# Patient Record
Sex: FEMALE | Race: WHITE | Employment: PART TIME
[De-identification: names, ages, dates, MRNs, and addresses within clinical notes are randomized per-mention and may not be internally consistent; named-entity substitution may affect disease eponyms.]

---
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
## 2021-10-01 ENCOUNTER — HOSPITAL ENCOUNTER (INPATIENT)
Facility: HOSPITAL | Age: 21
LOS: 4 days | Discharge: HOME | End: 2021-10-05
Attending: EMERGENCY MEDICINE | Admitting: HOSPITALIST
Payer: COMMERCIAL

## 2021-10-01 ENCOUNTER — APPOINTMENT (OUTPATIENT)
Dept: CT IMAGING | Facility: HOSPITAL | Age: 21
End: 2021-10-01
Attending: EMERGENCY MEDICINE
Payer: COMMERCIAL

## 2021-10-01 DIAGNOSIS — U07.1 PNEUMONIA DUE TO 2019 NOVEL CORONAVIRUS: ICD-10-CM

## 2021-10-01 DIAGNOSIS — J12.82 PNEUMONIA DUE TO 2019 NOVEL CORONAVIRUS: ICD-10-CM

## 2021-10-01 LAB
ALBUMIN SERPL-MCNC: 3.2 G/DL (ref 3.5–5)
ALP SERPL-CCNC: 58 U/L (ref 45–120)
ALT SERPL W P-5'-P-CCNC: 27 U/L (ref 0–45)
ANION GAP SERPL CALCULATED.3IONS-SCNC: 11 MMOL/L (ref 5–18)
AST SERPL W P-5'-P-CCNC: 29 U/L (ref 0–40)
BASOPHILS # BLD AUTO: 0 10E3/UL (ref 0–0.2)
BASOPHILS NFR BLD AUTO: 0 %
BILIRUB DIRECT SERPL-MCNC: 0.3 MG/DL
BILIRUB SERPL-MCNC: 0.7 MG/DL (ref 0–1)
BUN SERPL-MCNC: 11 MG/DL (ref 8–22)
C REACTIVE PROTEIN LHE: 5.4 MG/DL (ref 0–0.8)
CALCIUM SERPL-MCNC: 8.9 MG/DL (ref 8.5–10.5)
CHLORIDE BLD-SCNC: 103 MMOL/L (ref 98–107)
CO2 SERPL-SCNC: 25 MMOL/L (ref 22–31)
CREAT SERPL-MCNC: 0.68 MG/DL (ref 0.6–1.1)
D DIMER PPP FEU-MCNC: 1.5 UG/ML FEU (ref 0–0.5)
EOSINOPHIL # BLD AUTO: 0 10E3/UL (ref 0–0.7)
EOSINOPHIL NFR BLD AUTO: 0 %
ERYTHROCYTE [DISTWIDTH] IN BLOOD BY AUTOMATED COUNT: 11.9 % (ref 10–15)
GFR SERPL CREATININE-BSD FRML MDRD: >90 ML/MIN/1.73M2
GLUCOSE BLD-MCNC: 85 MG/DL (ref 70–125)
HCG SERPL-ACNC: <2 MLU/ML (ref 0–4)
HCT VFR BLD AUTO: 38.4 % (ref 35–47)
HGB BLD-MCNC: 13.1 G/DL (ref 11.7–15.7)
HOLD SPECIMEN: NORMAL
IMM GRANULOCYTES # BLD: 0 10E3/UL
IMM GRANULOCYTES NFR BLD: 0 %
LACTATE SERPL-SCNC: 1.1 MMOL/L (ref 0.7–2)
LYMPHOCYTES # BLD AUTO: 1.1 10E3/UL (ref 0.8–5.3)
LYMPHOCYTES NFR BLD AUTO: 19 %
MCH RBC QN AUTO: 29.6 PG (ref 26.5–33)
MCHC RBC AUTO-ENTMCNC: 34.1 G/DL (ref 31.5–36.5)
MCV RBC AUTO: 87 FL (ref 78–100)
MONOCYTES # BLD AUTO: 0.4 10E3/UL (ref 0–1.3)
MONOCYTES NFR BLD AUTO: 6 %
NEUTROPHILS # BLD AUTO: 4.4 10E3/UL (ref 1.6–8.3)
NEUTROPHILS NFR BLD AUTO: 75 %
NRBC # BLD AUTO: 0 10E3/UL
NRBC BLD AUTO-RTO: 0 /100
PLATELET # BLD AUTO: 200 10E3/UL (ref 150–450)
POTASSIUM BLD-SCNC: 3.6 MMOL/L (ref 3.5–5)
PROT SERPL-MCNC: 7.5 G/DL (ref 6–8)
RBC # BLD AUTO: 4.42 10E6/UL (ref 3.8–5.2)
SODIUM SERPL-SCNC: 139 MMOL/L (ref 136–145)
TROPONIN I SERPL-MCNC: <0.01 NG/ML (ref 0–0.29)
WBC # BLD AUTO: 5.9 10E3/UL (ref 4–11)

## 2021-10-01 PROCEDURE — 99223 1ST HOSP IP/OBS HIGH 75: CPT | Performed by: HOSPITALIST

## 2021-10-01 PROCEDURE — 250N000011 HC RX IP 250 OP 636: Performed by: EMERGENCY MEDICINE

## 2021-10-01 PROCEDURE — 250N000011 HC RX IP 250 OP 636: Performed by: HOSPITALIST

## 2021-10-01 PROCEDURE — 120N000001 HC R&B MED SURG/OB

## 2021-10-01 PROCEDURE — 83605 ASSAY OF LACTIC ACID: CPT | Performed by: EMERGENCY MEDICINE

## 2021-10-01 PROCEDURE — 96365 THER/PROPH/DIAG IV INF INIT: CPT

## 2021-10-01 PROCEDURE — 84702 CHORIONIC GONADOTROPIN TEST: CPT | Performed by: EMERGENCY MEDICINE

## 2021-10-01 PROCEDURE — 82248 BILIRUBIN DIRECT: CPT | Performed by: EMERGENCY MEDICINE

## 2021-10-01 PROCEDURE — 96375 TX/PRO/DX INJ NEW DRUG ADDON: CPT

## 2021-10-01 PROCEDURE — 85610 PROTHROMBIN TIME: CPT | Performed by: HOSPITALIST

## 2021-10-01 PROCEDURE — 85384 FIBRINOGEN ACTIVITY: CPT | Performed by: HOSPITALIST

## 2021-10-01 PROCEDURE — 84484 ASSAY OF TROPONIN QUANT: CPT | Performed by: EMERGENCY MEDICINE

## 2021-10-01 PROCEDURE — 71275 CT ANGIOGRAPHY CHEST: CPT

## 2021-10-01 PROCEDURE — 86141 C-REACTIVE PROTEIN HS: CPT | Performed by: EMERGENCY MEDICINE

## 2021-10-01 PROCEDURE — 85025 COMPLETE CBC W/AUTO DIFF WBC: CPT | Performed by: EMERGENCY MEDICINE

## 2021-10-01 PROCEDURE — XW033E5 INTRODUCTION OF REMDESIVIR ANTI-INFECTIVE INTO PERIPHERAL VEIN, PERCUTANEOUS APPROACH, NEW TECHNOLOGY GROUP 5: ICD-10-PCS | Performed by: HOSPITALIST

## 2021-10-01 PROCEDURE — 93005 ELECTROCARDIOGRAM TRACING: CPT | Performed by: HOSPITALIST

## 2021-10-01 PROCEDURE — 250N000009 HC RX 250: Performed by: HOSPITALIST

## 2021-10-01 PROCEDURE — 36415 COLL VENOUS BLD VENIPUNCTURE: CPT | Performed by: EMERGENCY MEDICINE

## 2021-10-01 PROCEDURE — 83615 LACTATE (LD) (LDH) ENZYME: CPT | Performed by: HOSPITALIST

## 2021-10-01 PROCEDURE — 85379 FIBRIN DEGRADATION QUANT: CPT | Performed by: EMERGENCY MEDICINE

## 2021-10-01 PROCEDURE — 96366 THER/PROPH/DIAG IV INF ADDON: CPT

## 2021-10-01 PROCEDURE — 258N000003 HC RX IP 258 OP 636: Performed by: HOSPITALIST

## 2021-10-01 PROCEDURE — 36415 COLL VENOUS BLD VENIPUNCTURE: CPT | Performed by: HOSPITALIST

## 2021-10-01 PROCEDURE — 99285 EMERGENCY DEPT VISIT HI MDM: CPT | Mod: 25

## 2021-10-01 RX ORDER — CALCIUM CARBONATE 500 MG/1
1000 TABLET, CHEWABLE ORAL 4 TIMES DAILY PRN
Status: DISCONTINUED | OUTPATIENT
Start: 2021-10-01 | End: 2021-10-05 | Stop reason: HOSPADM

## 2021-10-01 RX ORDER — CARBOXYMETHYLCELLULOSE SODIUM 5 MG/ML
1 SOLUTION/ DROPS OPHTHALMIC 3 TIMES DAILY PRN
Status: DISCONTINUED | OUTPATIENT
Start: 2021-10-01 | End: 2021-10-01

## 2021-10-01 RX ORDER — ONDANSETRON 2 MG/ML
4 INJECTION INTRAMUSCULAR; INTRAVENOUS EVERY 6 HOURS PRN
Status: DISCONTINUED | OUTPATIENT
Start: 2021-10-01 | End: 2021-10-05 | Stop reason: HOSPADM

## 2021-10-01 RX ORDER — ALBUTEROL SULFATE 90 UG/1
2 AEROSOL, METERED RESPIRATORY (INHALATION) EVERY 4 HOURS PRN
Status: DISCONTINUED | OUTPATIENT
Start: 2021-10-01 | End: 2021-10-05 | Stop reason: HOSPADM

## 2021-10-01 RX ORDER — BISACODYL 10 MG
10 SUPPOSITORY, RECTAL RECTAL DAILY PRN
Status: DISCONTINUED | OUTPATIENT
Start: 2021-10-01 | End: 2021-10-05 | Stop reason: HOSPADM

## 2021-10-01 RX ORDER — IOPAMIDOL 755 MG/ML
100 INJECTION, SOLUTION INTRAVASCULAR ONCE
Status: COMPLETED | OUTPATIENT
Start: 2021-10-01 | End: 2021-10-01

## 2021-10-01 RX ORDER — POLYETHYLENE GLYCOL 3350 17 G/17G
17 POWDER, FOR SOLUTION ORAL DAILY PRN
Status: DISCONTINUED | OUTPATIENT
Start: 2021-10-01 | End: 2021-10-05 | Stop reason: HOSPADM

## 2021-10-01 RX ORDER — ACETAMINOPHEN 325 MG/1
975 TABLET ORAL EVERY 8 HOURS PRN
Status: DISCONTINUED | OUTPATIENT
Start: 2021-10-01 | End: 2021-10-05 | Stop reason: HOSPADM

## 2021-10-01 RX ORDER — FAMOTIDINE 20 MG/1
20 TABLET, FILM COATED ORAL 2 TIMES DAILY PRN
Status: DISCONTINUED | OUTPATIENT
Start: 2021-10-01 | End: 2021-10-05 | Stop reason: HOSPADM

## 2021-10-01 RX ORDER — LIDOCAINE 40 MG/G
CREAM TOPICAL
Status: DISCONTINUED | OUTPATIENT
Start: 2021-10-01 | End: 2021-10-05 | Stop reason: HOSPADM

## 2021-10-01 RX ORDER — DEXAMETHASONE SODIUM PHOSPHATE 10 MG/ML
6 INJECTION, SOLUTION INTRAMUSCULAR; INTRAVENOUS ONCE
Status: COMPLETED | OUTPATIENT
Start: 2021-10-01 | End: 2021-10-01

## 2021-10-01 RX ORDER — ONDANSETRON 4 MG/1
4 TABLET, ORALLY DISINTEGRATING ORAL EVERY 6 HOURS PRN
Status: DISCONTINUED | OUTPATIENT
Start: 2021-10-01 | End: 2021-10-05 | Stop reason: HOSPADM

## 2021-10-01 RX ADMIN — DEXAMETHASONE SODIUM PHOSPHATE 6 MG: 10 INJECTION, SOLUTION INTRAMUSCULAR; INTRAVENOUS at 14:51

## 2021-10-01 RX ADMIN — REMDESIVIR 200 MG: 100 INJECTION, POWDER, LYOPHILIZED, FOR SOLUTION INTRAVENOUS at 22:58

## 2021-10-01 RX ADMIN — IOPAMIDOL 100 ML: 755 INJECTION, SOLUTION INTRAVENOUS at 16:43

## 2021-10-01 RX ADMIN — ENOXAPARIN SODIUM 40 MG: 40 INJECTION SUBCUTANEOUS at 23:01

## 2021-10-01 ASSESSMENT — ACTIVITIES OF DAILY LIVING (ADL): DEPENDENT_IADLS:: INDEPENDENT

## 2021-10-01 ASSESSMENT — MIFFLIN-ST. JEOR: SCORE: 1652.65

## 2021-10-01 NOTE — CONSULTS
Care Management Initial Consult    General Information  Assessment completed with: VM-chart review,    Type of CM/SW Visit: Initial Assessment    Primary Care Provider verified and updated as needed: Yes   Readmission within the last 30 days: no previous admission in last 30 days      Reason for Consult: discharge planning  Advance Care Planning:            Communication Assessment  Patient's communication style: spoken language (English or Bilingual)             Cognitive  Cognitive/Neuro/Behavioral: WDL                      Living Environment:   People in home: parent(s)     Current living Arrangements:        Able to return to prior arrangements:         Family/Social Support:  Care provided by:    Provides care for:                  Description of Support System:           Current Resources:   Patient receiving home care services: No     Community Resources: None  Equipment currently used at home: none  Supplies currently used at home: None    Employment/Financial:  Employment Status:          Financial Concerns:             Lifestyle & Psychosocial Needs:  Social Determinants of Health     Tobacco Use: Low Risk      Smoking Tobacco Use: Never Smoker     Smokeless Tobacco Use: Never Used   Alcohol Use:      Frequency of Alcohol Consumption:      Average Number of Drinks:      Frequency of Binge Drinking:    Financial Resource Strain:      Difficulty of Paying Living Expenses:    Food Insecurity:      Worried About Running Out of Food in the Last Year:      Ran Out of Food in the Last Year:    Transportation Needs:      Lack of Transportation (Medical):      Lack of Transportation (Non-Medical):    Physical Activity:      Days of Exercise per Week:      Minutes of Exercise per Session:    Stress:      Feeling of Stress :    Social Connections:      Frequency of Communication with Friends and Family:      Frequency of Social Gatherings with Friends and Family:      Attends Zoroastrianism Services:      Active Member of  Clubs or Organizations:      Attends Club or Organization Meetings:      Marital Status:    Intimate Partner Violence:      Fear of Current or Ex-Partner:      Emotionally Abused:      Physically Abused:      Sexually Abused:    Depression:      PHQ-2 Score:    Housing Stability:      Unable to Pay for Housing in the Last Year:      Number of Places Lived in the Last Year:      Unstable Housing in the Last Year:        Functional Status:  Prior to admission patient needed assistance:   Dependent ADLs:: Independent  Dependent IADLs:: Independent       Mental Health Status:          Chemical Dependency Status:                Values/Beliefs:  Spiritual, Cultural Beliefs, Mosque Practices, Values that affect care:                 Additional Information:    Pt +Covid. Info obtained via chart review.   Pt lives with father in a private residence. She is independent with all ADL's, has no services and no DME used.     Anticipate discharge home without needs depending on progression of care. Family to transport home at discharge.     Arianne Dowling, RN, CM, SVITLANA

## 2021-10-01 NOTE — ED PROVIDER NOTES
Emergency Department Encounter     Evaluation Date & Time:   10/1/2021  2:12 PM    CHIEF COMPLAINT:  Covid Concern      Triage Note:amb to triage.  Pt states about 2 wks ago dx'd with COVID.  Got better after about a week but then started getting different sx.  Reports passed out on , and cough and sob really bad.  Pt having fever/chills and waking up drenched.  O2 low in triage.  87% RA.  O2 n/c 2 liters applied.  CP with cough.  Last tylenol last night.         Impression and Plan       FINAL IMPRESSION:    ICD-10-CM    1. Pneumonia due to 2019 novel coronavirus  U07.1     J12.82          ED COURSE & MEDICAL DECISION MAKIN:27 PM Met with the patient, obtained history of present illness, and performed initial physical exam.  5:16 PM Discussed the case with Dr. Dubon, Hospitalist, who agrees to accept the patient.    PPE: Provider wore gloves, N95 mask, eye protection, surgical cap, and paper mask.     21 year old female, history of hypertriglyceridemia, acanthosis nigricans and vitamin D deficiency, who presents for evaluation of progressively worsening SOB x 5 days associated with Covid (diagnosed 2 weeks ago). She reports ongoing episodes of lightheadedness x 5 days and had a syncopal episode on .   She has not been vaccinated for Covid.     On exam, she has RRR with slight coarse breath sounds diffusely.     Patient hypoxic on presentation (87% on RA) - improved to upper 90%s on 4L via NC.  Patient given 6mg IV Decadron for hypoxia.     Patient placed on cardiac monitor, IV access established and blood sent for labs.    EKG ordered, however not yet performed. Troponin WNL (<0.01).    Labs remarkable for no leukocytosis (WBC 5.9) with elevated CRP (5.4) and normal lactate (1.1).  She has no significant electrolyte derangements, renal or hepatic impairment.  Pregnancy test negative.    CTA chest performed and demonstrated:  1.  No PE  2.  Moderate peripheral groundglass and solid subpleural  consolidation in the lungs typical of COVID-19 pneumonia  3.  Mildly mediastinal lymphadenopathy likely reactive  4.  Splenomegaly    Given hypoxia and worsening shortness of breath, decision made to admit patient for further monitoring and management.  Patient hemodynamically stable throughout ED course.      At the conclusion of the encounter I discussed the results of all the tests and the disposition. The questions were answered. The patient acknowledged understanding and was agreeable with the care plan.      MEDICATIONS GIVEN IN THE EMERGENCY DEPARTMENT:  Medications   dexamethasone PF (DECADRON) injection 6 mg (6 mg Intravenous Given 10/1/21 1451)   iopamidol (ISOVUE-370) solution 100 mL (100 mLs Intravenous Given 10/1/21 1643)       NEW PRESCRIPTIONS STARTED AT TODAY'S ED VISIT:  New Prescriptions    No medications on file       HPI     The history is provided by the patient.      Jaylene Burks is a 21 year old female with a pertinent history of hypertriglyceridemia, acanthosis nigricans and vitamin D deficiency, who presents to this ED for evaluation of a Covid concern.    Patient was diagnosed with COVID 2 weeks ago; symptoms at that time were primarily sore throat, ear pain, chills and sweats. Symptoms persisted for ~1 week and then resolved. Then Sunday (5 days ago), she developed episodes of lightheadedness and proceeded to pass out while she was sitting in a chair. She denies any associated injury.  She went develop shortness of breath Monday night into Tuesday which has gradually worsened.  The shortness of breath is worse with exertion.  She reports associated cough when she takes deep breaths.  She has generalized chest pain with cough and deep inspiration.  She reports ongoing sweats, but is unsure if she has fevers.  She has not been vaccinated against Covid.    She otherwise has been in her usual state of health and denies abdominal pain, N/V/D or other concerns.    LNMP 10 days ago.  She had  taken birth control pills for 1 month; last dose 3 weeks ago.    Patient was vaping for ~2 months, however quit a couple months ago.    No chronic medical conditions.  No previous surgeries.  NKDA.    REVIEW OF SYSTEMS:  All other systems reviewed and are negative.      Medical History     No past medical history on file.    Past Surgical History:   Procedure Laterality Date     NO HISTORY OF SURGERY         Family History   Problem Relation Age of Onset     No Known Problems Mother      No Known Problems Father      Thyroid Disease Maternal Grandmother        Social History     Tobacco Use     Smoking status: Never Smoker     Smokeless tobacco: Never Used     Tobacco comment: Uncle smokes outdoors temp lives with pt.s family   Vaping Use     Vaping Use: Former     Start date: 5/1/2021     Quit date: 9/1/2021     Substances: Nicotine   Substance Use Topics     Alcohol use: Not Currently     Drug use: Never       No current outpatient medications on file.      Physical Exam     First Vitals:  Patient Vitals for the past 24 hrs:   BP Temp Temp src Pulse Resp SpO2 Height Weight   10/01/21 2030 101/53 -- -- 56 25 100 % -- --   10/01/21 2015 106/56 -- -- 109 20 98 % -- --   10/2000 99/56 -- -- 77 26 99 % -- --   10/01/21 1900 95/52 -- -- 64 26 93 % -- --   10/01/21 1845 100/59 -- -- 54 23 100 % -- --   10/01/21 1830 98/54 -- -- 52 (!) 34 97 % -- --   10/01/21 1815 129/81 -- -- 75 (!) 31 99 % -- --   10/01/21 1800 93/55 -- -- 55 23 95 % -- --   10/01/21 1745 110/61 -- -- 66 (!) 34 99 % -- --   10/01/21 1730 105/56 -- -- 102 22 99 % -- --   10/01/21 1715 99/58 -- -- 53 21 100 % -- --   10/01/21 1700 104/59 -- -- 61 21 100 % -- --   10/01/21 1649 101/56 -- -- 59 18 100 % -- --   10/01/21 1615 106/55 -- -- 60 (!) 33 100 % -- --   10/01/21 1557 106/56 -- -- 71 -- 98 % -- --   10/01/21 1545 107/58 -- -- 63 -- 100 % -- --   10/01/21 1530 103/55 -- -- 58 -- 100 % -- --   10/01/21 1500 108/60 -- -- 62 22 100 % -- --  "  10/01/21 1446 113/55 -- -- 69 15 99 % -- --   10/01/21 1418 107/61 -- -- 65 -- 95 % -- --   10/01/21 1346 -- -- -- -- -- 94 % -- --   10/01/21 1343 133/66 97.5  F (36.4  C) Temporal 85 22 (!) 87 % 1.727 m (5' 8\") 83.9 kg (185 lb)       PHYSICAL EXAM:   Physical Exam    GENERAL: Awake, alert.  In no acute distress.   HEENT: Normocephalic, atraumatic. Pupils equal, round and reactive. Conjunctiva normal.  Normal posterior oropharynx without swelling, erythema or exudates.  NECK: No stridor.  PULMONARY: Symmetrical, slightly coarse, breath sounds without distress.  No audible wheezes or rales.  CARDIO: Regular rate and rhythm.  No significant murmur, rub or gallop.  Radial pulses strong and symmetrical.  ABDOMINAL: Abdomen soft, non-distended and non-tender to palpation.   EXTREMITIES: No lower extremity swelling or edema.      NEURO: Alert and oriented to person, place and time.  Cranial nerves grossly intact.  No focal motor deficit.  PSYCH: Normal mood and affect.  SKIN: No rashes.     Results     LAB:  All pertinent labs reviewed and interpreted  Labs Ordered and Resulted from Time of ED Arrival Up to the Time of Departure from the ED   CRP INFLAMMATION - Abnormal; Notable for the following components:       Result Value    CRP 5.4 (*)     All other components within normal limits   HEPATIC FUNCTION PANEL - Abnormal; Notable for the following components:    Albumin 3.2 (*)     All other components within normal limits   D DIMER QUANTITATIVE - Abnormal; Notable for the following components:    D-Dimer Quantitative 1.50 (*)     All other components within normal limits    Narrative:     This D-dimer assay is intended for use in conjunction with a clinical pretest probability assessment model to exclude pulmonary embolism (PE) and deep venous thrombosis (DVT) in outpatients suspected of PE or DVT. The cut-off value is 0.50 ug/mL FEU.   BASIC METABOLIC PANEL - Normal   TROPONIN I - Normal   LACTIC ACID WHOLE BLOOD - " Normal   HCG QUANTITATIVE PREGNANCY - Normal   CBC WITH PLATELETS AND DIFFERENTIAL   EXTRA BLUE TOP TUBE   EXTRA RED TOP TUBE   EXTRA GREEN TOP (LITHIUM HEPARIN) TUBE   EXTRA PURPLE TOP TUBE   CARDIAC CONTINUOUS MONITORING   CALL   CBC WITH PLATELETS & DIFFERENTIAL    Narrative:     The following orders were created for panel order CBC with Platelets & Differential.  Procedure                               Abnormality         Status                     ---------                               -----------         ------                     CBC with platelets and d...[[id number]]                      Final result                 Please view results for these tests on the individual orders.   EXTRA TUBE    Narrative:     The following orders were created for panel order Posey Draw.  Procedure                               Abnormality         Status                     ---------                               -----------         ------                     Extra Blue Top Tube[[id number]]                              Final result               Extra Red Top Tube[[id number]]                               Final result               Extra Green Top (Lithium...[[id number]]                      Final result               Extra Purple Top Tube[[id number]]                            Final result                 Please view results for these tests on the individual orders.       RADIOLOGY:  CT Chest Pulmonary Embolism w Contrast   Final Result   IMPRESSION:   1.  No pulmonary embolism.      2.  Moderate peripheral groundglass and solid subpleural consolidation in the lungs typical of COVID-19 pneumonia      3.  Mildly mediastinal lymphadenopathy likely reactive      4.  Splenomegaly.          I, Floridalma Moyer, am serving as a scribe to document services personally performed by Malinda Flores MD based on my observation and the provider's statements to me. I, Malinda Flores MD attest that Floridalma Moyer is acting in a scribe capacity, has  observed my performance of the services and has documented them in accordance with my direction.    Malinda Flores MD  Emergency Medicine  [facility] EMERGENCY DEPARTMENT         Malinda Flores MD  10/01/21 5532

## 2021-10-01 NOTE — H&P
[facility]    History and Physical - Hospitalist Service       Date of Admission:  10/1/2021  Jaylene Burks,  2000, MRN [id number]  PCP: LESTER TRAYLOR    Assessment & Plan      Jaylene Burks is a 21 year old female admitted on 10/1/2021. She does not have any significant past medical history and was diagnosed with Covid on  and returns for worsening dyspnea found to be hypoxic     COVID-19 Diagnosis Details:  Onset of COVID symptoms unknown   Date of positive test results    Research study No     # Confirmed COVID-19 infection    # Acute Hypoxic Respiratory Failure secondary to COVID-19 infection  # Viral Pneumonia secondary to COVID-19 infection  - Chest CT with contrast shows moderate bilateral groundglass infiltrates and dense subpleural consolidations.  On admission she is requiring 4 L of supplemental oxygen.  - Continue supportive care with continuous pulse oximetry, COVID-19 special precautions, minimized lab draws, and care consolidation  - Oxygen: continue current support with nasal cannula at 4 L/min; titrate to keep SpO2 between 90-96%  - Labs: CBC, creatinine/BMP, CRP, LDH, D-dimer and fibrinogen daily or as appropriate until patient clinically stable.  - Imaging: no additional imaging needed at this time  - Breathing treatments: albuterol inhaler four times a day PRN; avoid nebulizers in favor of MDIs   - IV fluids: not indicated at this time  - Antibiotics: not indicated   - Treatments: Dexamethasone 6 mg x 10 days or until hospital discharge, started on 10/1 and Remdesivir x 5 days or until hospital discharge, started on 10/1  - Contact Counseling: addressed. She has been quarantining at her mom's house and her mom is out of town.  - DVT Prophylaxis: At high risk of thrombotic complications due to COVID-19 disease (last DDimer 1.5).          - PROPHYLACTIC dosing: lovenox 40mg daily  - Immunization status: Not vaccinated   - Discharge Anticoagulation:  At discharge consider one of the following for 30 days and until the patient has returned to normal mobility:        - Apixaban (Eliquis) 2.5 mg two times a day        - Rivaroxaban (Xarelto) 10 mg once daily    #Syncope  x1 on 9/26  Suspect due to COVID19 and hypoxia  No PE on CT  Check EKG and monitor on tele overnight for completeness       Diet:   regular diet  Costello Catheter: Not present  Central Lines: None  Code Status:   Full    Clinically Significant Risk Factors Present on Admission                   Disposition Plan   Expected discharge: 10/03/2021 recommended to prior living arrangement once hypoxia resolved.     The patient's care was discussed with the Patient.    Caridad Dubon MD  [facility]  Text page via [facility] Paging/Directory      ______________________________________________________________________    Chief Complaint   Chills, dyspnea    History of Present Illness   Jaylene Burks is a 21 year old female who presents for chills and shortness of breath.  Patient reports she had a sore throat a few weeks ago and was diagnosed with COVID-19.  Symptoms then improved for a little while.  However in the past week or so they have worsened again.  She had a syncopal event on 9/26.  She has been having bad chills for a few days and feeling more short of breath.  She denies significant cough.  Sore throat has resolved.  Appetite is very poor and food does not taste right.  Denies nausea or diarrhea.  Very tearful.  She did not receive Covid vaccination.  Patient declined for me to call and update of her parents.    Review of Systems    The 10 point Review of Systems is negative other than noted in the HPI or here.     Past Medical History    I have reviewed this patient's medical history and updated it with pertinent information if needed.   No past medical history on file.    Past Surgical History   I have reviewed this patient's surgical history and updated it with pertinent  information if needed.  Past Surgical History:   Procedure Laterality Date     NO HISTORY OF SURGERY         Social History   I have reviewed this patient's social history and updated it with pertinent information if needed.  Social History     Tobacco Use     Smoking status: Never Smoker     Smokeless tobacco: Never Used     Tobacco comment: Uncle smokes outdoors temp lives with pt.s family   Vaping Use     Vaping Use: Former     Start date: 5/1/2021     Quit date: 9/1/2021     Substances: Nicotine   Substance Use Topics     Alcohol use: Not Currently     Drug use: Never       Family History   I have reviewed this patient's family history and updated it with pertinent information if needed.  Family History   Problem Relation Age of Onset     No Known Problems Mother      No Known Problems Father      Thyroid Disease Maternal Grandmother        Prior to Admission Medications   None     Allergies   Allergies   Allergen Reactions     Nkda [No Known Drug Allergies]        Physical Exam   Vital Signs: Temp: 97.5  F (36.4  C) Temp src: Temporal BP: 99/58 Pulse: 53   Resp: 21 SpO2: 100 % O2 Device: Nasal cannula Oxygen Delivery: 4 LPM  Weight: 185 lbs 0 oz    General: in no apparent distress, non-toxic and alert female lying in hospital bed oriented x3. tearful  HEENT: Head normocephalic atraumatic, oral mucosa moist. Sclerae anicteric  CV: Regular rhythm, normal rate, no murmurs  Resp: No wheezes, no rales or rhonchi, no focal consolidations  GI: Belly soft, nondistended, nontender, bowel sounds present  Skin: No rashes or lesions  Extremities: No peripheral edema  Psych: Normal affect, anxious mood  Neuro: CNII-XII grossly intact, moving all 4 extremities    Data   Data reviewed today: I reviewed all medications, new labs and imaging results over the last 24 hours.  No EKG tracing obtained    Recent Results (from the past 12 hour(s))   Basic metabolic panel    Collection Time: 10/01/21  2:45 PM   Result Value Ref Range     Sodium 139 136 - 145 mmol/L    Potassium 3.6 3.5 - 5.0 mmol/L    Chloride 103 98 - 107 mmol/L    Carbon Dioxide (CO2) 25 22 - 31 mmol/L    Anion Gap 11 5 - 18 mmol/L    Urea Nitrogen 11 8 - 22 mg/dL    Creatinine 0.68 0.60 - 1.10 mg/dL    Calcium 8.9 8.5 - 10.5 mg/dL    Glucose 85 70 - 125 mg/dL    GFR Estimate >90 >60 mL/min/1.73m2   Troponin I    Collection Time: 10/01/21  2:45 PM   Result Value Ref Range    Troponin I <0.01 0.00 - 0.29 ng/mL   Lactic acid whole blood    Collection Time: 10/01/21  2:45 PM   Result Value Ref Range    Lactic Acid 1.1 0.7 - 2.0 mmol/L   CRP inflammation    Collection Time: 10/01/21  2:45 PM   Result Value Ref Range    CRP 5.4 (H) 0.0-<0.8 mg/dL   Hepatic function panel    Collection Time: 10/01/21  2:45 PM   Result Value Ref Range    Bilirubin Total 0.7 0.0 - 1.0 mg/dL    Bilirubin Direct 0.3 <=0.5 mg/dL    Protein Total 7.5 6.0 - 8.0 g/dL    Albumin 3.2 (L) 3.5 - 5.0 g/dL    Alkaline Phosphatase 58 45 - 120 U/L    AST 29 0 - 40 U/L    ALT 27 0 - 45 U/L   D dimer quantitative    Collection Time: 10/01/21  2:45 PM   Result Value Ref Range    D-Dimer Quantitative 1.50 (H) 0.00 - 0.50 ug/mL FEU   CBC with platelets and differential    Collection Time: 10/01/21  2:45 PM   Result Value Ref Range    WBC Count 5.9 4.0 - 11.0 10e3/uL    RBC Count 4.42 3.80 - 5.20 10e6/uL    Hemoglobin 13.1 11.7 - 15.7 g/dL    Hematocrit 38.4 35.0 - 47.0 %    MCV 87 78 - 100 fL    MCH 29.6 26.5 - 33.0 pg    MCHC 34.1 31.5 - 36.5 g/dL    RDW 11.9 10.0 - 15.0 %    Platelet Count 200 150 - 450 10e3/uL    % Neutrophils 75 %    % Lymphocytes 19 %    % Monocytes 6 %    % Eosinophils 0 %    % Basophils 0 %    % Immature Granulocytes 0 %    NRBCs per 100 WBC 0 <1 /100    Absolute Neutrophils 4.4 1.6 - 8.3 10e3/uL    Absolute Lymphocytes 1.1 0.8 - 5.3 10e3/uL    Absolute Monocytes 0.4 0.0 - 1.3 10e3/uL    Absolute Eosinophils 0.0 0.0 - 0.7 10e3/uL    Absolute Basophils 0.0 0.0 - 0.2 10e3/uL    Absolute Immature  Granulocytes 0.0 <=0.0 10e3/uL    Absolute NRBCs 0.0 10e3/uL   Extra Blue Top Tube    Collection Time: 10/01/21  2:45 PM   Result Value Ref Range    Hold Specimen JIC    Extra Red Top Tube    Collection Time: 10/01/21  2:45 PM   Result Value Ref Range    Hold Specimen JIC    Extra Green Top (Lithium Heparin) Tube    Collection Time: 10/01/21  2:45 PM   Result Value Ref Range    Hold Specimen JIC    Extra Purple Top Tube    Collection Time: 10/01/21  2:45 PM   Result Value Ref Range    Hold Specimen JIC    HCG quantitative pregnancy (blood)    Collection Time: 10/01/21  2:45 PM   Result Value Ref Range    hCG Quantitative <2 0 - 4 mlU/mL

## 2021-10-01 NOTE — ED TRIAGE NOTES
amb to triage.  Pt states about 2 wks ago dx'd with COVID.  Got better after about a week but then started getting different sx.  Reports passed out on Sunday, and cough and sob really bad.  Pt having fever/chills and waking up drenched.  O2 low in triage.  87% RA.  O2 n/c 2 liters applied.  CP with cough.  Last tylenol last night.

## 2021-10-01 NOTE — ED NOTES
On room.  Not doing much.    Given turkey sandwich and fluids.    Continues on oxygen.  No respiratory distress noted.

## 2021-10-01 NOTE — PHARMACY-ADMISSION MEDICATION HISTORY
Pharmacy Note - Admission Medication History    Pertinent Provider Information: The patient reports taking zinc and vitamin C over the past few weeks when she got sick but does not take these normally.  She reports taking ibuprofen 400-600 mg on an occasional basis.     ______________________________________________________________________    Prior To Admission (PTA) med list completed and updated in EMR.       No outpatient medications have been marked as taking for the 10/1/21 encounter (Hospital Encounter).       Information source(s): Patient and CareEverywhere/SureScripts  Method of interview communication: phone    Summary of Changes to PTA Med List  New: n/a  Discontinued: n/a  Changed: n/a    Patient was asked about OTC/herbal products specifically.  PTA med list reflects this.    Allergies were reviewed, assessed, and updated with the patient.      Patient does not use any multi-dose medications prior to admission.    The information provided in this note is only as accurate as the sources available at the time of the update(s).    Thank you for the opportunity to participate in the care of this patient.    Ivette Camacho RPH  10/1/2021 3:11 PM

## 2021-10-02 LAB
ANION GAP SERPL CALCULATED.3IONS-SCNC: 10 MMOL/L (ref 5–18)
ATRIAL RATE - MUSE: 44 BPM
BUN SERPL-MCNC: 14 MG/DL (ref 8–22)
C REACTIVE PROTEIN LHE: 4.6 MG/DL (ref 0–0.8)
CALCIUM SERPL-MCNC: 8.9 MG/DL (ref 8.5–10.5)
CHLORIDE BLD-SCNC: 107 MMOL/L (ref 98–107)
CO2 SERPL-SCNC: 22 MMOL/L (ref 22–31)
CREAT SERPL-MCNC: 0.53 MG/DL (ref 0.6–1.1)
D DIMER PPP FEU-MCNC: 0.77 UG/ML FEU (ref 0–0.5)
DIASTOLIC BLOOD PRESSURE - MUSE: 55 MMHG
ERYTHROCYTE [DISTWIDTH] IN BLOOD BY AUTOMATED COUNT: 11.9 % (ref 10–15)
FIBRINOGEN PPP-MCNC: 489 MG/DL (ref 170–490)
FIBRINOGEN PPP-MCNC: 504 MG/DL (ref 170–490)
GFR SERPL CREATININE-BSD FRML MDRD: >90 ML/MIN/1.73M2
GLUCOSE BLD-MCNC: 104 MG/DL (ref 70–125)
HCT VFR BLD AUTO: 36.6 % (ref 35–47)
HGB BLD-MCNC: 12.5 G/DL (ref 11.7–15.7)
INR PPP: 1.02 (ref 0.85–1.15)
INTERPRETATION ECG - MUSE: NORMAL
LACTATE SERPL-SCNC: 1 MMOL/L (ref 0.7–2)
LDH SERPL L TO P-CCNC: 374 U/L (ref 125–220)
LDH SERPL L TO P-CCNC: 452 U/L (ref 125–220)
MAGNESIUM SERPL-MCNC: 1.9 MG/DL (ref 1.8–2.6)
MCH RBC QN AUTO: 29.5 PG (ref 26.5–33)
MCHC RBC AUTO-ENTMCNC: 34.2 G/DL (ref 31.5–36.5)
MCV RBC AUTO: 86 FL (ref 78–100)
P AXIS - MUSE: 59 DEGREES
PLATELET # BLD AUTO: 222 10E3/UL (ref 150–450)
POTASSIUM BLD-SCNC: 4.1 MMOL/L (ref 3.5–5)
PR INTERVAL - MUSE: 116 MS
QRS DURATION - MUSE: 108 MS
QT - MUSE: 482 MS
QTC - MUSE: 412 MS
R AXIS - MUSE: 74 DEGREES
RBC # BLD AUTO: 4.24 10E6/UL (ref 3.8–5.2)
SODIUM SERPL-SCNC: 139 MMOL/L (ref 136–145)
SYSTOLIC BLOOD PRESSURE - MUSE: 100 MMHG
T AXIS - MUSE: 48 DEGREES
TROPONIN I SERPL-MCNC: <0.01 NG/ML (ref 0–0.29)
TSH SERPL DL<=0.005 MIU/L-ACNC: 0.49 UIU/ML (ref 0.3–5)
VENTRICULAR RATE- MUSE: 44 BPM
WBC # BLD AUTO: 3.8 10E3/UL (ref 4–11)

## 2021-10-02 PROCEDURE — 85384 FIBRINOGEN ACTIVITY: CPT | Performed by: HOSPITALIST

## 2021-10-02 PROCEDURE — 99233 SBSQ HOSP IP/OBS HIGH 50: CPT | Performed by: FAMILY MEDICINE

## 2021-10-02 PROCEDURE — 36415 COLL VENOUS BLD VENIPUNCTURE: CPT | Performed by: FAMILY MEDICINE

## 2021-10-02 PROCEDURE — 258N000003 HC RX IP 258 OP 636: Performed by: HOSPITALIST

## 2021-10-02 PROCEDURE — 250N000009 HC RX 250: Performed by: HOSPITALIST

## 2021-10-02 PROCEDURE — 84443 ASSAY THYROID STIM HORMONE: CPT | Performed by: FAMILY MEDICINE

## 2021-10-02 PROCEDURE — 85379 FIBRIN DEGRADATION QUANT: CPT | Performed by: HOSPITALIST

## 2021-10-02 PROCEDURE — 250N000012 HC RX MED GY IP 250 OP 636 PS 637: Performed by: HOSPITALIST

## 2021-10-02 PROCEDURE — 80048 BASIC METABOLIC PNL TOTAL CA: CPT | Performed by: HOSPITALIST

## 2021-10-02 PROCEDURE — 83615 LACTATE (LD) (LDH) ENZYME: CPT | Performed by: HOSPITALIST

## 2021-10-02 PROCEDURE — 83735 ASSAY OF MAGNESIUM: CPT | Performed by: FAMILY MEDICINE

## 2021-10-02 PROCEDURE — 250N000011 HC RX IP 250 OP 636: Performed by: HOSPITALIST

## 2021-10-02 PROCEDURE — 84484 ASSAY OF TROPONIN QUANT: CPT | Performed by: HOSPITALIST

## 2021-10-02 PROCEDURE — 83605 ASSAY OF LACTIC ACID: CPT | Performed by: FAMILY MEDICINE

## 2021-10-02 PROCEDURE — 120N000001 HC R&B MED SURG/OB

## 2021-10-02 PROCEDURE — 85027 COMPLETE CBC AUTOMATED: CPT | Performed by: HOSPITALIST

## 2021-10-02 PROCEDURE — 36415 COLL VENOUS BLD VENIPUNCTURE: CPT | Performed by: HOSPITALIST

## 2021-10-02 PROCEDURE — 86141 C-REACTIVE PROTEIN HS: CPT | Performed by: HOSPITALIST

## 2021-10-02 RX ADMIN — REMDESIVIR 100 MG: 100 INJECTION, POWDER, LYOPHILIZED, FOR SOLUTION INTRAVENOUS at 18:16

## 2021-10-02 RX ADMIN — SODIUM CHLORIDE 50 ML: 9 INJECTION, SOLUTION INTRAVENOUS at 01:05

## 2021-10-02 RX ADMIN — ENOXAPARIN SODIUM 40 MG: 40 INJECTION SUBCUTANEOUS at 22:25

## 2021-10-02 RX ADMIN — SODIUM CHLORIDE 50 ML: 9 INJECTION, SOLUTION INTRAVENOUS at 19:53

## 2021-10-02 RX ADMIN — DEXAMETHASONE 6 MG: 4 TABLET ORAL at 08:36

## 2021-10-02 NOTE — PLAN OF CARE
Problem: Adult Inpatient Plan of Care  Goal: Optimal Comfort and Wellbeing  Outcome: Improving   Pt currently on 3 Lpm O2 via nasal canula. Pt dropped as low as 82% O2 saturation while transferring to [facility]. RN titrated O2 up to 10 Lpm to bring her up to 91% while seated. RN set Pt up for bed bath. Pt was unsteady on feet. Stand by assistance provided by RN. Pt was able to give herself a bed bath with setup assistance. Pt had some appetite. Pt to move to room 202 on P2 this AM. RN encouraged Pt to go into prone position as tolerated. No other concerns noted.   Hamilton Rios RN  10/2/2021  11:22 AM

## 2021-10-02 NOTE — PROGRESS NOTES
[facility]    Medicine Progress Note - Hospitalist Service       Date of Admission:  10/1/2021  Active Problems:    Pneumonia due to 2019 novel coronavirus     Assessment & Plan           21-year-old female otherwise healthy admitted for shortness of breath, lightheadedness, syncope found to have acute hypoxic respiratory failure secondary to Covid pneumonia    # Confirmed COVID-19 infection    # Acute Hypoxic Respiratory Failure secondary to COVID-19 infection  # Bradycardia     Symptom Onset 9/16   Date of 1st Positive Test Use date of first positive test.   Vaccination Status Not Vaccinated, but interested/contemplative       - Admit to medical floor with continuous pulse ox, COVID-19 special precautions  - Oxygen: continue current support with nasal cannula at 3 L/min; titrate to keep SpO2 between 90-96%  - Labs: daily COVID labs ordered (CBC, creatinine, retic count, LDH, INR/PT, D-dimer, fibrinogen, troponin, CRP)   - Imaging: chest CT with contrast ordered  - Breathing treatments: no inhalers needed; avoid nebulizers in favor of MDIs   - IV fluids: not indicated at this time  - Antibiotics: not indicated   - COVID-Focused Medications: Dexamethasone 6 mg x 10 days or until hospital discharge, started on 10/1 and Remdesivir x 5 days or until hospital discharge, started on 10/1  - DVT Prophylaxis: at high risk of thrombotic complications due to COVID-19 (DDimer = 0.77 ug/mL FEU (Ref range: 0.00 - 0.50 ug/mL FEU) ).          - PROPHYLACTIC dosing: lovenox 40mg daily        - consider anticoag on discharge for 30 days & until return to normal mobility    Sinus bradycardia-suspect chronic, heart rates in the low 60s on previous notes, more bradycardic here, high 30s to 60s.  Check TSH, magnesium, potassium okay.  Follow on telemetry, asymptomatic, has orthostatic lightheadedness but improved, had prior to admission.  Question if remdesivir contributing.       Diet: Combination Diet Regular  "Diet Adult    DVT Prophylaxis: Enoxaparin (Lovenox) SQ  Costello Catheter: Not present  Central Lines: None  Code Status: Full Code      Disposition Plan   Expected discharge: 10/03/2021   recommended to prior living arrangement once O2 use less than 0 liters/minute.     The patient's care was discussed with the Bedside Nurse, Care Coordinator/, Patient and Patient's Family. for total time 40 minutes with greater than 50% of total time spent in counseling and coordination of care.    LESTER KISER MD  Hospitalist Service  [facility]  Securely message with the Vocera Web Console (learn more here)  Text page via [employer] Paging/Directory        Clinically Significant Risk Factors Present on Admission                   ______________________________________________________________________    Interval History   Remainder of 12 point review of systems negative except as noted below    Subjective:  Feeling better.  Has mild lightheadedness with standing, present for over a week.  Mild nonproductive cough.  Regained her sense of taste.  Had fevers previously, none here.  No chest pain, shortness of breath improved.  Cough improved.  Overall feeling better.  Is willing to get the vaccine after discharge.    Data reviewed today: I reviewed all medications, new labs and imaging results over the last 24 hours.     Physical Exam   Vital Signs: Temp: 97.9  F (36.6  C) Temp src: Oral BP: 101/56 Pulse: 59   Resp: 19 SpO2: 99 % O2 Device: Nasal cannula Oxygen Delivery: 3 LPM  Weight: 185 lbs 0 oz  Physical Exam:  Temp:  [97.8  F (36.6  C)-98  F (36.7  C)] 97.9  F (36.6  C)  Pulse:  [] 59  Resp:  [17-34] 19  BP: ()/(43-81) 101/56  SpO2:  [81 %-100 %] 99 %    /56 (BP Location: Left arm)   Pulse 59   Temp 97.9  F (36.6  C) (Oral)   Resp 19   Ht 1.727 m (5' 8\")   Wt 83.9 kg (185 lb)   LMP 09/22/2021   SpO2 99%   BMI 28.13 kg/m    General appearance: alert, appears stated age " and cooperative  Head: Normocephalic, without obvious abnormality, atraumatic  Eyes: Clear conjuctiva  Neck: no JVD and supple, symmetrical, trachea midline  Lungs: clear to auscultation bilaterally  Heart: S1, S2 normal, no murmur, click, rub or gallop, regular rate and rhythm, regular rate and rhythm, S1, S2 normal, no murmur, click, rub or gallop  Abdomen: soft, non-tender; bowel sounds normal; no masses,  no organomegaly  Extremities: Esa's sign is negative, no sign of DVT  Skin: Skin color, texture, turgor normal. No rashes or lesions  Neurologic: Mental status: Alert, oriented, thought content appropriate  Cranial nerves: normal  Sensory: normal  Motor:grossly normal          Data   Recent Labs   Lab 10/02/21  0927 10/01/21  2302 10/01/21  1445   WBC 3.8*  --  5.9   HGB 12.5  --  13.1   MCV 86  --  87     --  200   INR  --  1.02  --      --  139   POTASSIUM 4.1  --  3.6   CHLORIDE 107  --  103   CO2 22  --  25   BUN 14  --  11   CR 0.53*  --  0.68   ANIONGAP 10  --  11   GERALDO 8.9  --  8.9     --  85   ALBUMIN  --   --  3.2*   PROTTOTAL  --   --  7.5   BILITOTAL  --   --  0.7   ALKPHOS  --   --  58   ALT  --   --  27   AST  --   --  29     No results found for this or any previous visit (from the past 24 hour(s)).  Medications     - MEDICATION INSTRUCTIONS -         remdesivir  100 mg Intravenous Q24H    And     sodium chloride 0.9%  50 mL Intravenous Q24H     dexamethasone  6 mg Oral Daily     enoxaparin ANTICOAGULANT  40 mg Subcutaneous Q24H     sodium chloride (PF)  3 mL Intracatheter Q8H

## 2021-10-02 NOTE — ED NOTES
Pt b/p 77/47 when laying on her side, repositioned on her back retook b/p 100/59, pt denies any complaints, EDMD updated, will continue to monitor and assist as needed.

## 2021-10-02 NOTE — PLAN OF CARE
Problem: Adult Inpatient Plan of Care  Goal: Plan of Care Review  Outcome: Improving  Flowsheets (Taken 10/2/2021 1255)  Plan of Care Reviewed With: patient  Progress: improving   Pt arrived from ER at 1200 via stretcher.  No pain noted.  Bp 102/57 and HR 50.  97% on 3L nasal canula.  Lung sounds  diminished.  Pt appears comfortable at this time.

## 2021-10-02 NOTE — ED NOTES
Pts call light answered and pt asking for new blanket and towel for back.  Tech also brought pt a new gown.  Pt assisted with gown change due to IV tubing and linen change.  Pt resting comfortably and not needing anything else at this time.

## 2021-10-03 LAB
ALBUMIN SERPL-MCNC: 2.9 G/DL (ref 3.5–5)
ALP SERPL-CCNC: 46 U/L (ref 45–120)
ALT SERPL W P-5'-P-CCNC: 24 U/L (ref 0–45)
ANION GAP SERPL CALCULATED.3IONS-SCNC: 9 MMOL/L (ref 5–18)
AST SERPL W P-5'-P-CCNC: 22 U/L (ref 0–40)
BILIRUB DIRECT SERPL-MCNC: 0.2 MG/DL
BILIRUB SERPL-MCNC: 0.5 MG/DL (ref 0–1)
BUN SERPL-MCNC: 14 MG/DL (ref 8–22)
C REACTIVE PROTEIN LHE: 1.6 MG/DL (ref 0–0.8)
CALCIUM SERPL-MCNC: 8.6 MG/DL (ref 8.5–10.5)
CHLORIDE BLD-SCNC: 109 MMOL/L (ref 98–107)
CO2 SERPL-SCNC: 24 MMOL/L (ref 22–31)
CREAT SERPL-MCNC: 0.54 MG/DL (ref 0.6–1.1)
D DIMER PPP FEU-MCNC: 0.61 UG/ML FEU (ref 0–0.5)
ERYTHROCYTE [DISTWIDTH] IN BLOOD BY AUTOMATED COUNT: 11.9 % (ref 10–15)
FIBRINOGEN PPP-MCNC: 389 MG/DL (ref 170–490)
GFR SERPL CREATININE-BSD FRML MDRD: >90 ML/MIN/1.73M2
GLUCOSE BLD-MCNC: 116 MG/DL (ref 70–125)
HCT VFR BLD AUTO: 35.2 % (ref 35–47)
HGB BLD-MCNC: 12 G/DL (ref 11.7–15.7)
LDH SERPL L TO P-CCNC: 385 U/L (ref 125–220)
MCH RBC QN AUTO: 29.9 PG (ref 26.5–33)
MCHC RBC AUTO-ENTMCNC: 34.1 G/DL (ref 31.5–36.5)
MCV RBC AUTO: 88 FL (ref 78–100)
PLATELET # BLD AUTO: 254 10E3/UL (ref 150–450)
POTASSIUM BLD-SCNC: 4.2 MMOL/L (ref 3.5–5)
PROT SERPL-MCNC: 6.6 G/DL (ref 6–8)
RBC # BLD AUTO: 4.02 10E6/UL (ref 3.8–5.2)
SODIUM SERPL-SCNC: 142 MMOL/L (ref 136–145)
WBC # BLD AUTO: 6 10E3/UL (ref 4–11)

## 2021-10-03 PROCEDURE — 36415 COLL VENOUS BLD VENIPUNCTURE: CPT | Performed by: HOSPITALIST

## 2021-10-03 PROCEDURE — 85384 FIBRINOGEN ACTIVITY: CPT | Performed by: HOSPITALIST

## 2021-10-03 PROCEDURE — 86141 C-REACTIVE PROTEIN HS: CPT | Performed by: HOSPITALIST

## 2021-10-03 PROCEDURE — 85027 COMPLETE CBC AUTOMATED: CPT | Performed by: HOSPITALIST

## 2021-10-03 PROCEDURE — 250N000009 HC RX 250: Performed by: HOSPITALIST

## 2021-10-03 PROCEDURE — 85379 FIBRIN DEGRADATION QUANT: CPT | Performed by: HOSPITALIST

## 2021-10-03 PROCEDURE — 83615 LACTATE (LD) (LDH) ENZYME: CPT | Performed by: HOSPITALIST

## 2021-10-03 PROCEDURE — 120N000001 HC R&B MED SURG/OB

## 2021-10-03 PROCEDURE — 258N000003 HC RX IP 258 OP 636: Performed by: HOSPITALIST

## 2021-10-03 PROCEDURE — 250N000011 HC RX IP 250 OP 636: Performed by: HOSPITALIST

## 2021-10-03 PROCEDURE — 250N000012 HC RX MED GY IP 250 OP 636 PS 637: Performed by: HOSPITALIST

## 2021-10-03 PROCEDURE — 82248 BILIRUBIN DIRECT: CPT | Performed by: FAMILY MEDICINE

## 2021-10-03 PROCEDURE — 99232 SBSQ HOSP IP/OBS MODERATE 35: CPT | Performed by: FAMILY MEDICINE

## 2021-10-03 RX ORDER — LANOLIN ALCOHOL/MO/W.PET/CERES
3 CREAM (GRAM) TOPICAL
Status: DISCONTINUED | OUTPATIENT
Start: 2021-10-03 | End: 2021-10-05 | Stop reason: HOSPADM

## 2021-10-03 RX ADMIN — DEXAMETHASONE 6 MG: 4 TABLET ORAL at 09:17

## 2021-10-03 RX ADMIN — REMDESIVIR 100 MG: 100 INJECTION, POWDER, LYOPHILIZED, FOR SOLUTION INTRAVENOUS at 17:43

## 2021-10-03 RX ADMIN — SODIUM CHLORIDE 50 ML: 9 INJECTION, SOLUTION INTRAVENOUS at 19:30

## 2021-10-03 RX ADMIN — ENOXAPARIN SODIUM 40 MG: 40 INJECTION SUBCUTANEOUS at 22:14

## 2021-10-03 NOTE — PROGRESS NOTES
[facility]    Medicine Progress Note - Hospitalist Service       Date of Admission:  10/1/2021  Active Problems:    Pneumonia due to 2019 novel coronavirus     Assessment & Plan             21-year-old female otherwise healthy admitted for shortness of breath, lightheadedness, syncope found to have acute hypoxic respiratory failure secondary to Covid pneumonia    # Confirmed COVID-19 infection    # Acute Hypoxic Respiratory Failure secondary to COVID-19 infection  # Bradycardia     Symptom Onset 9/16   Date of 1st Positive Test Use date of first positive test.   Vaccination Status Not Vaccinated, but interested/contemplative       - Admit to medical floor with continuous pulse ox, COVID-19 special precautions  - Oxygen: continue current support with nasal cannula at 0 L/min; titrate to keep SpO2 between 90-96%  - Labs: daily COVID labs ordered (CBC, creatinine, retic count, LDH, INR/PT, D-dimer, fibrinogen, troponin, CRP)   - Imaging: chest CT with contrast ordered  - Breathing treatments: no inhalers needed; avoid nebulizers in favor of MDIs   - IV fluids: not indicated at this time  - Antibiotics: not indicated   - COVID-Focused Medications: Dexamethasone 6 mg x 10 days or until hospital discharge, started on 10/1 and Remdesivir x 5 days or until hospital discharge, started on 10/1  - DVT Prophylaxis: at high risk of thrombotic complications due to COVID-19 (DDimer = 0.77 ug/mL FEU (Ref range: 0.00 - 0.50 ug/mL FEU) ).          - PROPHYLACTIC dosing: lovenox 40mg daily        - consider anticoag on discharge for 30 days & until return to normal mobility  Improved today, off of oxygen but still desats a little bit with activity according to staff.  Inflammatory markers improving, improved CRP, LDH, D-dimer  Continue current treatment.    Sinus bradycardia-suspect chronic, heart rates in the low 60s on previous notes, more bradycardic here, high 30s to 60s.  Normal TSH, magnesium, potassium  okay.  Follow on telemetry, asymptomatic, has orthostatic lightheadedness that has resolved.    Question if remdesivir contributing.  Bradycardia improved when awake, worse at night.  Continue remdesivir for now.       Diet: Regular Diet Adult    DVT Prophylaxis: Enoxaparin (Lovenox) SQ  Costello Catheter: Not present  Central Lines: None  Code Status: Full Code      Disposition Plan   Expected discharge: 10/03/2021   recommended to prior living arrangement once O2 use less than 0 liters/minute.     The patient's care was discussed with the Bedside Nurse, Care Coordinator/, Patient and Patient's Family. for total time 36 minutes with greater than 50% of total time spent in counseling and coordination of care.    LESTER KISER MD  Hospitalist Service  [facility]  Securely message with the Vocera Web Console (learn more here)  Text page via [employer] Paging/Directory        Clinically Significant Risk Factors Present on Admission                ______________________________________________________________________    Interval History   Remainder of 12 point review of systems negative except as noted below    Subjective:  Feeling better.  Has mild lightheadedness with standing, present for over a week.  Mild nonproductive cough.  Regained her sense of taste.  Had fevers previously, none here.  No chest pain, shortness of breath improved.  Cough improved.  Overall feeling better.  Is willing to get the vaccine after discharge.    Data reviewed today: I reviewed all medications, new labs and imaging results over the last 24 hours.     Physical Exam   Vital Signs: Temp: 98.2  F (36.8  C) Temp src: Oral BP: 106/52 Pulse: 58   Resp: 19 SpO2: 94 % O2 Device: None (Room air) Oxygen Delivery: 1/2 LPM  Weight: 185 lbs 0 oz  Physical Exam:  Temp:  [97.3  F (36.3  C)-98.2  F (36.8  C)] 98.2  F (36.8  C)  Pulse:  [37-66] 58  Resp:  [16-20] 19  BP: ()/(52-63) 106/52  SpO2:  [91 %-98 %] 94  "%    /52 (BP Location: Left arm)   Pulse 58   Temp 98.2  F (36.8  C) (Oral)   Resp 19   Ht 1.727 m (5' 8\")   Wt 83.9 kg (185 lb)   LMP 09/22/2021   SpO2 94%   BMI 28.13 kg/m    General appearance: alert, appears stated age and cooperative  Head: Normocephalic, without obvious abnormality, atraumatic  Eyes: Clear conjuctiva  Neck: no JVD and supple, symmetrical, trachea midline  Lungs: clear to auscultation bilaterally  Heart: S1, S2 normal, no murmur, click, rub or gallop, regular rate and rhythm, regular rate and rhythm, S1, S2 normal, no murmur, click, rub or gallop  Abdomen: soft, non-tender; bowel sounds normal; no masses,  no organomegaly  Extremities: Esa's sign is negative, no sign of DVT  Skin: Skin color, texture, turgor normal. No rashes or lesions  Neurologic: Mental status: Alert, oriented, thought content appropriate  Cranial nerves: normal  Sensory: normal  Motor:grossly normal          Data   Recent Labs   Lab 10/03/21  0636 10/02/21  0927 10/01/21  2302 10/01/21  1445   WBC 6.0 3.8*  --  5.9   HGB 12.0 12.5  --  13.1   MCV 88 86  --  87    222  --  200   INR  --   --  1.02  --     139  --  139   POTASSIUM 4.2 4.1  --  3.6   CHLORIDE 109* 107  --  103   CO2 24 22  --  25   BUN 14 14  --  11   CR 0.54* 0.53*  --  0.68   ANIONGAP 9 10  --  11   GERALDO 8.6 8.9  --  8.9    104  --  85   ALBUMIN 2.9*  --   --  3.2*   PROTTOTAL 6.6  --   --  7.5   BILITOTAL 0.5  --   --  0.7   ALKPHOS 46  --   --  58   ALT 24  --   --  27   AST 22  --   --  29     No results found for this or any previous visit (from the past 24 hour(s)).  Medications     - MEDICATION INSTRUCTIONS -         remdesivir  100 mg Intravenous Q24H    And     sodium chloride 0.9%  50 mL Intravenous Q24H     dexamethasone  6 mg Oral Daily     enoxaparin ANTICOAGULANT  40 mg Subcutaneous Q24H     sodium chloride (PF)  3 mL Intracatheter Q8H     "

## 2021-10-03 NOTE — PLAN OF CARE
Problem: Adult Inpatient Plan of Care  Goal: Optimal Comfort and Wellbeing  Outcome: Improving     Problem: Gas Exchange Impaired  Goal: Optimal Gas Exchange  Outcome: Improving   Patient alert and oriented x 4. No complain of pain. Not in respiratory distress. O2 saturation stable at 97-99% at 2L, O2 decreased to 1L tolerating it well O2 sat  at 93%.  Bradycardic throughout the shift, resident MD aware.  Encourage to use call light.  Slept on and off.   Latest 02 95% at .5L/NC

## 2021-10-03 NOTE — PROVIDER NOTIFICATION
Patient was bradycardic, HR lowest at 37. Resident MD notified regarding patient heart rate and ok to set the pulse ox alarm at 40.

## 2021-10-03 NOTE — PROGRESS NOTES
Care Management Follow Up    Length of Stay (days): 2    Expected Discharge Date: 10/03/2021 or 10/4/21, once stable off of oxygen (goal).       Concerns to be Addressed:   Alteration in respiratory status secondary to COVID-19, requiring IV Remdesivir and supplemental oxygen (now down to 1 liter).     Patient plan of care discussed at interdisciplinary rounds: Yes    Anticipated Discharge Disposition:  Home.      Anticipated Discharge Services:  None.  Anticipated Discharge DME:  To be determined.     Patient/family educated on Medicare website which has current facility and service quality ratings:  NA  Education Provided on the Discharge Plan:  Per team.  Patient/Family in Agreement with the Plan:  Yes    Referrals Placed by CM/SW:  None.  Private pay costs discussed: Not applicable     Additional Information:  Patient is independent with activities of daily living at baseline.  No care management needs identified at this time.        Ceci Irving RN

## 2021-10-03 NOTE — PLAN OF CARE
Problem: Gas Exchange Impaired  Goal: Optimal Gas Exchange  Outcome: Improving   Pt is now on room air, sating 91-92%.  Does desat with activity, but recovers quickly.  Encouraging self proning and incentive spirometry to 2500.  Lung sound diminished, but clear at this time.  Occasional congested, nonproductive cough.      Problem: Adult Inpatient Plan of Care  Goal: Optimal Comfort and Wellbeing  Outcome: Improving   No complaints of pain.  States she is starting to feel a little better.

## 2021-10-04 LAB
ANION GAP SERPL CALCULATED.3IONS-SCNC: 6 MMOL/L (ref 5–18)
BUN SERPL-MCNC: 14 MG/DL (ref 8–22)
C REACTIVE PROTEIN LHE: 0.5 MG/DL (ref 0–0.8)
CALCIUM SERPL-MCNC: 8.6 MG/DL (ref 8.5–10.5)
CHLORIDE BLD-SCNC: 108 MMOL/L (ref 98–107)
CO2 SERPL-SCNC: 25 MMOL/L (ref 22–31)
CREAT SERPL-MCNC: 0.54 MG/DL (ref 0.6–1.1)
D DIMER PPP FEU-MCNC: 0.57 UG/ML FEU (ref 0–0.5)
ERYTHROCYTE [DISTWIDTH] IN BLOOD BY AUTOMATED COUNT: 11.9 % (ref 10–15)
FIBRINOGEN PPP-MCNC: 342 MG/DL (ref 170–490)
GFR SERPL CREATININE-BSD FRML MDRD: >90 ML/MIN/1.73M2
GLUCOSE BLD-MCNC: 103 MG/DL (ref 70–125)
HCT VFR BLD AUTO: 34.8 % (ref 35–47)
HGB BLD-MCNC: 11.5 G/DL (ref 11.7–15.7)
LDH SERPL L TO P-CCNC: 284 U/L (ref 125–220)
MCH RBC QN AUTO: 29.1 PG (ref 26.5–33)
MCHC RBC AUTO-ENTMCNC: 33 G/DL (ref 31.5–36.5)
MCV RBC AUTO: 88 FL (ref 78–100)
PLATELET # BLD AUTO: 252 10E3/UL (ref 150–450)
POTASSIUM BLD-SCNC: 4.2 MMOL/L (ref 3.5–5)
RBC # BLD AUTO: 3.95 10E6/UL (ref 3.8–5.2)
SODIUM SERPL-SCNC: 139 MMOL/L (ref 136–145)
TROPONIN I SERPL-MCNC: <0.01 NG/ML (ref 0–0.29)
WBC # BLD AUTO: 7 10E3/UL (ref 4–11)

## 2021-10-04 PROCEDURE — 83615 LACTATE (LD) (LDH) ENZYME: CPT | Performed by: HOSPITALIST

## 2021-10-04 PROCEDURE — 84484 ASSAY OF TROPONIN QUANT: CPT | Performed by: HOSPITALIST

## 2021-10-04 PROCEDURE — 85384 FIBRINOGEN ACTIVITY: CPT | Performed by: HOSPITALIST

## 2021-10-04 PROCEDURE — 80048 BASIC METABOLIC PNL TOTAL CA: CPT | Performed by: HOSPITALIST

## 2021-10-04 PROCEDURE — 36415 COLL VENOUS BLD VENIPUNCTURE: CPT | Performed by: HOSPITALIST

## 2021-10-04 PROCEDURE — 250N000011 HC RX IP 250 OP 636: Performed by: HOSPITALIST

## 2021-10-04 PROCEDURE — 120N000001 HC R&B MED SURG/OB

## 2021-10-04 PROCEDURE — 85379 FIBRIN DEGRADATION QUANT: CPT | Performed by: HOSPITALIST

## 2021-10-04 PROCEDURE — 85027 COMPLETE CBC AUTOMATED: CPT | Performed by: HOSPITALIST

## 2021-10-04 PROCEDURE — 250N000012 HC RX MED GY IP 250 OP 636 PS 637: Performed by: HOSPITALIST

## 2021-10-04 PROCEDURE — 99232 SBSQ HOSP IP/OBS MODERATE 35: CPT | Performed by: FAMILY MEDICINE

## 2021-10-04 PROCEDURE — 86141 C-REACTIVE PROTEIN HS: CPT | Performed by: HOSPITALIST

## 2021-10-04 RX ADMIN — DEXAMETHASONE 6 MG: 4 TABLET ORAL at 10:02

## 2021-10-04 RX ADMIN — ENOXAPARIN SODIUM 40 MG: 40 INJECTION SUBCUTANEOUS at 21:38

## 2021-10-04 NOTE — PLAN OF CARE
Pt remains on room air low to mid 90s.  No complaints from patient other then perspiring on and off.  No difficulty breathing or shortness of breath.  Lung sounds diminished.  Steady on feet.  Encouraging pt to self prone per md recommendations.  Pt states she has not done it today. Remdesivir given as ordered.

## 2021-10-04 NOTE — PLAN OF CARE
"BP 98/62 (BP Location: Left arm)   Pulse (!) 39   Temp 97.6  F (36.4  C) (Oral)   Resp 18   Ht 1.727 m (5' 8\")   Wt 83.9 kg (185 lb)   LMP 09/22/2021   SpO2 94%   BMI 28.13 kg/m      Problem: Adult Inpatient Plan of Care  Goal: Optimal Comfort and Wellbeing  Outcome: Improving     Problem: Gas Exchange Impaired  Goal: Optimal Gas Exchange  Outcome: Improving  Intervention: Optimize Oxygenation and Ventilation  Recent Flowsheet Documentation  Taken 10/4/2021 0532 by Aaliyah Olmstead, RN  Head of Bed (HOB) Positioning: HOB at 20 degrees  Taken 10/4/2021 0200 by Aaliyah Olmstead, RN    Head of Bed (HOB) Positioning: HOB flat   Patient alert and oriented x 4, stable on her feet, denies pain when asked. O2 sat stable at 93-94% at RA. Bradycardic throughout the shift, Junctional rhythm on Tele monitoring. Perspires a lot, bed linen change PRN. Slept on and off. Will continue to monitor.  "

## 2021-10-04 NOTE — PROVIDER NOTIFICATION
Pt going in and out of junctional rhythm,  Monitor tech confirmation.  Text paged MD on findings.  Pt is asymptomatic.

## 2021-10-04 NOTE — PROGRESS NOTES
[facility]    Medicine Progress Note - Hospitalist Service       Date of Admission:  10/1/2021  Active Problems:    Pneumonia due to 2019 novel coronavirus     Assessment & Plan             21-year-old female otherwise healthy admitted for shortness of breath, lightheadedness, syncope found to have acute hypoxic respiratory failure secondary to Covid pneumonia    # Confirmed COVID-19 infection    # Acute Hypoxic Respiratory Failure secondary to COVID-19 infection  # Bradycardia     Symptom Onset 9/16   Date of 1st Positive Test Use date of first positive test.   Vaccination Status Not Vaccinated, but interested/contemplative       - Admit to medical floor with continuous pulse ox, COVID-19 special precautions  - Oxygen: continue current support with nasal cannula at 0 L/min; titrate to keep SpO2 between 90-96%  - Labs: daily COVID labs ordered (CBC, creatinine, retic count, LDH, INR/PT, D-dimer, fibrinogen, troponin, CRP)   - Imaging: chest CT with contrast ordered  - Breathing treatments: no inhalers needed; avoid nebulizers in favor of MDIs   - IV fluids: not indicated at this time  - Antibiotics: not indicated   - COVID-Focused Medications: Dexamethasone 6 mg x 10 days or until hospital discharge, started on 10/1 and Remdesivir x 5 days or until hospital discharge, started on 10/1.  Given improvement and continued bradycardia will discontinue remdesivir  - DVT Prophylaxis: at high risk of thrombotic complications due to COVID-19 (DDimer = 0.77 ug/mL FEU (Ref range: 0.00 - 0.50 ug/mL FEU) ).          - PROPHYLACTIC dosing: lovenox 40mg daily        - consider anticoag on discharge for 30 days & until return to normal mobility  Continues to improve, off of oxygen but still desats to 89% with activity intermittently.   Inflammatory markers improving, improved CRP, LDH, D-dimer    Sinus bradycardia-suspect acute on chronic, heart rates in the low 60s on previous notes, more bradycardic here,  high 30s to 60s.  Normal TSH, magnesium, potassium okay.  Follow on telemetry, asymptomatic, has orthostatic lightheadedness that has resolved.    Question if remdesivir contributing.  Will discontinue remdesivir since she has clinically improved.        Diet: Regular Diet Adult    DVT Prophylaxis: Enoxaparin (Lovenox) SQ  Costello Catheter: Not present  Central Lines: None  Code Status: Full Code      Disposition Plan   Expected discharge:  Discharged home in 1 day if O2 needs improved  The patient's care was discussed with the Bedside Nurse, Care Coordinator/, Patient and Patient's Family. for total time 36 minutes with greater than 50% of total time spent in counseling and coordination of care.    LESTER KISER MD  Hospitalist Service  [facility]  Securely message with the [employer]oc[employer] Web Console (learn more here)  Text page via [employer] Paging/Directory        Clinically Significant Risk Factors Present on Admission                ______________________________________________________________________    Interval History   Remainder of 12 point review of systems negative except as noted below    Subjective:  Patient doing well.  Still a little hypoxic with activity, 89%, otherwise not requiring oxygen.    Mild nonproductive cough.  Regained her sense of taste.  Had fevers previously, none here.  No chest pain, shortness of breath improved.  Cough improved.  Overall feeling better.  Is willing to get the vaccine after discharge.    Data reviewed today: I reviewed all medications, new labs and imaging results over the last 24 hours.     Physical Exam   Vital Signs: Temp: 98.2  F (36.8  C) Temp src: Oral BP: 105/57 Pulse: 50   Resp: 18 SpO2: 94 % O2 Device: None (Room air)    Weight: 185 lbs 0 oz  Physical Exam:  Temp:  [97.6  F (36.4  C)-98.2  F (36.8  C)] 98.2  F (36.8  C)  Pulse:  [39-58] 50  Resp:  [16-19] 18  BP: ()/(52-64) 105/57  SpO2:  [90 %-96 %] 94 %    /57 (BP  "Location: Right arm)   Pulse 50   Temp 98.2  F (36.8  C) (Oral)   Resp 18   Ht 1.727 m (5' 8\")   Wt 83.9 kg (185 lb)   LMP 09/22/2021   SpO2 94%   BMI 28.13 kg/m    General appearance: alert, appears stated age and cooperative  Head: Normocephalic, without obvious abnormality, atraumatic  Eyes: Clear conjuctiva  Neck: no JVD and supple, symmetrical, trachea midline  Lungs: clear to auscultation bilaterally  Heart: S1, S2 normal, no murmur, click, rub or gallop, regular rate and rhythm, regular rate and rhythm, S1, S2 normal, no murmur, click, rub or gallop  Abdomen: soft, non-tender; bowel sounds normal; no masses,  no organomegaly  Extremities: Esa's sign is negative, no sign of DVT  Skin: Skin color, texture, turgor normal. No rashes or lesions  Neurologic: Mental status: Alert, oriented, thought content appropriate  Cranial nerves: normal  Sensory: normal  Motor:grossly normal          Data   Recent Labs   Lab 10/04/21  0625 10/03/21  0636 10/02/21  0927 10/01/21  2302 10/01/21  1445 10/01/21  1445   WBC 7.0 6.0 3.8*  --    < > 5.9   HGB 11.5* 12.0 12.5  --    < > 13.1   MCV 88 88 86  --    < > 87    254 222  --    < > 200   INR  --   --   --  1.02  --   --     142 139  --    < > 139   POTASSIUM 4.2 4.2 4.1  --    < > 3.6   CHLORIDE 108* 109* 107  --    < > 103   CO2 25 24 22  --    < > 25   BUN 14 14 14  --    < > 11   CR 0.54* 0.54* 0.53*  --    < > 0.68   ANIONGAP 6 9 10  --    < > 11   GERALDO 8.6 8.6 8.9  --    < > 8.9    116 104  --    < > 85   ALBUMIN  --  2.9*  --   --   --  3.2*   PROTTOTAL  --  6.6  --   --   --  7.5   BILITOTAL  --  0.5  --   --   --  0.7   ALKPHOS  --  46  --   --   --  58   ALT  --  24  --   --   --  27   AST  --  22  --   --   --  29    < > = values in this interval not displayed.     No results found for this or any previous visit (from the past 24 hour(s)).  Medications     - MEDICATION INSTRUCTIONS -         [Held by provider] remdesivir  100 mg " Intravenous Q24H    And     [Held by provider] sodium chloride 0.9%  50 mL Intravenous Q24H     dexamethasone  6 mg Oral Daily     enoxaparin ANTICOAGULANT  40 mg Subcutaneous Q24H     influenza quadrivalent (PF) vacc  0.5 mL Intramuscular Prior to discharge     sodium chloride (PF)  3 mL Intracatheter Q8H

## 2021-10-04 NOTE — PLAN OF CARE
NURSING NOTE  0700 - 1500    Problem: Gas Exchange Impaired  Goal: Optimal Gas Exchange  Outcome: Improving  Intervention: Optimize Oxygenation and Ventilation     D: Received on room air this shift, SPO2 >94% ar rest & 89% for a brief       moment at the beginning of an activity, then sustaining 90-91% during      activity. No distress. Denies of SOB today. Lungs are clear.  A: Encouraged IS use, self proning on bed as needed. Encouraged to be       up on the chair. Due meds given - PO steroids.   R: No acute issues. Will continue with plan of care.       - On tele - junctional rhythm with heart rates >41bpm. Reports that dizziness/    lightheadedness with activity has resolved. Otherwise, afebrile and VSS.

## 2021-10-05 VITALS
RESPIRATION RATE: 16 BRPM | TEMPERATURE: 98.2 F | BODY MASS INDEX: 28.04 KG/M2 | HEART RATE: 44 BPM | WEIGHT: 185 LBS | OXYGEN SATURATION: 97 % | SYSTOLIC BLOOD PRESSURE: 108 MMHG | DIASTOLIC BLOOD PRESSURE: 60 MMHG | HEIGHT: 68 IN

## 2021-10-05 LAB
ANION GAP SERPL CALCULATED.3IONS-SCNC: 7 MMOL/L (ref 5–18)
BUN SERPL-MCNC: 13 MG/DL (ref 8–22)
C REACTIVE PROTEIN LHE: 0.3 MG/DL (ref 0–0.8)
CALCIUM SERPL-MCNC: 8.8 MG/DL (ref 8.5–10.5)
CHLORIDE BLD-SCNC: 107 MMOL/L (ref 98–107)
CO2 SERPL-SCNC: 25 MMOL/L (ref 22–31)
CREAT SERPL-MCNC: 0.62 MG/DL (ref 0.6–1.1)
D DIMER PPP FEU-MCNC: 0.57 UG/ML FEU (ref 0–0.5)
ERYTHROCYTE [DISTWIDTH] IN BLOOD BY AUTOMATED COUNT: 11.9 % (ref 10–15)
FIBRINOGEN PPP-MCNC: 325 MG/DL (ref 170–490)
GFR SERPL CREATININE-BSD FRML MDRD: >90 ML/MIN/1.73M2
GLUCOSE BLD-MCNC: 101 MG/DL (ref 70–125)
HCT VFR BLD AUTO: 37.8 % (ref 35–47)
HGB BLD-MCNC: 12.5 G/DL (ref 11.7–15.7)
LDH SERPL L TO P-CCNC: 273 U/L (ref 125–220)
MCH RBC QN AUTO: 29.7 PG (ref 26.5–33)
MCHC RBC AUTO-ENTMCNC: 33.1 G/DL (ref 31.5–36.5)
MCV RBC AUTO: 90 FL (ref 78–100)
PLATELET # BLD AUTO: 316 10E3/UL (ref 150–450)
POTASSIUM BLD-SCNC: 4.3 MMOL/L (ref 3.5–5)
RBC # BLD AUTO: 4.21 10E6/UL (ref 3.8–5.2)
SODIUM SERPL-SCNC: 139 MMOL/L (ref 136–145)
WBC # BLD AUTO: 8.2 10E3/UL (ref 4–11)

## 2021-10-05 PROCEDURE — 85384 FIBRINOGEN ACTIVITY: CPT | Performed by: HOSPITALIST

## 2021-10-05 PROCEDURE — 80048 BASIC METABOLIC PNL TOTAL CA: CPT | Performed by: HOSPITALIST

## 2021-10-05 PROCEDURE — 83615 LACTATE (LD) (LDH) ENZYME: CPT | Performed by: HOSPITALIST

## 2021-10-05 PROCEDURE — 250N000012 HC RX MED GY IP 250 OP 636 PS 637: Performed by: HOSPITALIST

## 2021-10-05 PROCEDURE — 36415 COLL VENOUS BLD VENIPUNCTURE: CPT | Performed by: HOSPITALIST

## 2021-10-05 PROCEDURE — 85027 COMPLETE CBC AUTOMATED: CPT | Performed by: HOSPITALIST

## 2021-10-05 PROCEDURE — 86141 C-REACTIVE PROTEIN HS: CPT | Performed by: HOSPITALIST

## 2021-10-05 PROCEDURE — 85379 FIBRIN DEGRADATION QUANT: CPT | Performed by: HOSPITALIST

## 2021-10-05 PROCEDURE — 99239 HOSP IP/OBS DSCHRG MGMT >30: CPT | Performed by: FAMILY MEDICINE

## 2021-10-05 RX ADMIN — DEXAMETHASONE 6 MG: 4 TABLET ORAL at 10:09

## 2021-10-05 NOTE — PLAN OF CARE
Problem: Adult Inpatient Plan of Care  Goal: Plan of Care Review  Outcome: Adequate for Discharge  Pt with no SOB.  Heart rate improved.  Pt states she feels way better than when she came in.  On Room air sating 95%.  Reviewed discharge orders with pt who verbalized understanding and will ambulate out with NA when ride comes to the front.

## 2021-10-05 NOTE — PLAN OF CARE
Problem: Gas Exchange Impaired  Goal: Optimal Gas Exchange  Outcome: Improving     Problem: Adult Inpatient Plan of Care  Goal: Plan of Care Review  Outcome: No Change     Patient reports that she feels better today.  Tolerated room air.  Denies shortness of breath.  Sinus steven on tele.   (2) everted (after stimulation)

## 2021-10-05 NOTE — PLAN OF CARE
Problem: Gas Exchange Impaired  Goal: Optimal Gas Exchange  Outcome: Improving   Patient remains on room air, no respiratory distress, no dyspnea, denies pain or discomfort, remains on airborne precautions, had a little nose blood, states her nose was dry, pressure was applied and it did stop, remains on telemetry, read Sinus Loyd, has been independent in room.

## 2021-10-06 NOTE — DISCHARGE SUMMARY
[facility]  Hospitalist Discharge Summary      Date of Admission:  10/1/2021  Date of Discharge:  10/5/2021  3:40 PM  Discharging Provider: LESTER KISER MD      Discharge Diagnoses   Active Problems:    Pneumonia due to 2019 novel coronavirus       Follow-ups Needed After Discharge   Discharge Procedure Orders   COVID-19 GetWell Loop Referral   Standing Status: Future   Referral Priority: Routine Referral Type: Consultation   Number of Visits Requested: 1     Reason for your hospital stay   Order Comments: COVID     Follow-up and recommended labs and tests    Order Comments: Follow up with primary care provider, Physician No Ref-Primary, within 3-5 daysdays for hospital follow- up.  The following labs/tests are recommended: CBC.     Contact provider   Order Comments: Contact your primary care provider if If increased trouble breathing, new arm/leg swelling, dizziness/passing out, falls, bleeding that doesn't stop, or uncontrolled pain.     Discharge - Quarantine/Isolation Instruction   Order Comments: Date of symptom onset: 9/16  Date of first positive test: 9/18  Stay home and away from others (self-isolate) for at least 20 days from when your symptoms started And...   You've had no fevers and no medicine that reduces fever for 1 full day (24 hours)  And...   Your other symptoms have resolved (gotten better).     When to contact your care team   Order Comments: Call your primary doctor if you have any of the following:  increased shortness of breath, increased pain, or fever.     Activity   Order Comments: Your activity upon discharge: activity as tolerated     Order Specific Question Answer Comments   Is discharge order? Yes      Diet   Order Comments: Follow this diet upon discharge: Orders Placed This Encounter      Regular Diet Adult     Order Specific Question Answer Comments   Is discharge order? Yes           Discharge Disposition   Discharged to home  Condition at discharge:  Good      Hospital Course   21-year-old female otherwise healthy admitted for shortness of breath, lightheadedness, syncope found to have acute hypoxic respiratory failure secondary to Covid pneumonia     # Confirmed COVID-19 infection    # Acute Hypoxic Respiratory Failure secondary to COVID-19 infection  # Bradycardia     Symptom Onset 9/16   Date of 1st Positive Test Use date of first positive test.   Vaccination Status Not Vaccinated, but interested/contemplative         - Admit to medical floor with continuous pulse ox, COVID-19 special precautions  - Oxygen: Weaned completely off of oxygen  - Labs: daily COVID labs ordered, inflammatory markers improving.  - Imaging: chest CT with contrast ordered: Shows bilateral pneumonia consistent with Covid  - Breathing treatments: no inhalers needed; avoid nebulizers in favor of MDIs   - IV fluids: not indicated at this time  - Antibiotics: not indicated   - COVID-Focused Medications: Dexamethasone 6 mg x 10 days or until hospital discharge, started on 10/1 and Remdesivir x 5 days or until hospital discharge, started on 10/1.  Given improvement and continued bradycardia will discontinue remdesivir on 10/4.  - DVT Prophylaxis: at high risk of thrombotic complications due to COVID-19 (DDimer = 0.77 ug/mL FEU (Ref range: 0.00 - 0.50 ug/mL FEU) ).          - PROPHYLACTIC dosing: lovenox 40mg daily        - consider anticoag on discharge for 30 days & until return to normal mobility  Hypoxia at rest and with exertion resolved,     Sinus bradycardia-suspect acute on chronic, heart rates in the low 60s on previous notes, more bradycardic here, high 30s to 60s.  Worse at night. Normal TSH, magnesium, potassium okay.    No dizziness.  Asymptomatic.  Question if remdesivir contributing.  Remdesivir discontinued on 10/4, heart rate in the 50s to 60s at discharge.      Discharged on Eliquis, discussed risks of bleeding.       Consultations This Hospital Stay   SOCIAL WORK IP  "CONSULT    Code Status   Prior    Time Spent on this Encounter   I, LESTER KISER MD, personally saw the patient today and spent greater than 30 minutes discharging this patient.       LESTER KISER MD  [address] [address]  Phone: [phone]  Fax: [phone]  ______________________________________________________________________    Physical Exam   Vital Signs:                   Weight: 185 lbs 0 oz  Physical Exam:       /60 (BP Location: Left arm)   Pulse (!) 44   Temp 98.2  F (36.8  C) (Oral)   Resp 16   Ht 1.727 m (5' 8\")   Wt 83.9 kg (185 lb)   LMP 09/22/2021   SpO2 97%   BMI 28.13 kg/m    General appearance: alert, appears stated age and cooperative  Head: Normocephalic, without obvious abnormality, atraumatic  Eyes: Clear conjuctiva  Neck: no JVD and supple, symmetrical, trachea midline  Lungs: clear to auscultation bilaterally  Heart: regular rate and rhythm, S1, S2 normal, no murmur, click, rub or gallop  Abdomen: soft, non-tender; bowel sounds normal; no masses,  no organomegaly  Extremities: Esa's sign is negative, no sign of DVT  Skin: Skin color, texture, turgor normal. No rashes or lesions  Neurologic: Grossly normal             Primary Care Physician   Physician No Ref-Primary    Discharge Orders      COVID-19 GetWell Loop Referral      Reason for your hospital stay    COVID     Follow-up and recommended labs and tests     Follow up with primary care provider, Physician No Ref-Primary, within 3-5 daysdays for hospital follow- up.  The following labs/tests are recommended: CBC.     Contact provider    Contact your primary care provider if If increased trouble breathing, new arm/leg swelling, dizziness/passing out, falls, bleeding that doesn't stop, or uncontrolled pain.     Discharge - Quarantine/Isolation Instruction    Date of symptom onset: 9/16  Date of first positive test: 9/18  Stay home and away from others " (self-isolate) for at least 20 days from when your symptoms started And...   You've had no fevers and no medicine that reduces fever for 1 full day (24 hours)  And...   Your other symptoms have resolved (gotten better).     When to contact your care team    Call your primary doctor if you have any of the following:  increased shortness of breath, increased pain, or fever.     Activity    Your activity upon discharge: activity as tolerated     Diet    Follow this diet upon discharge: Orders Placed This Encounter      Regular Diet Adult       Significant Results and Procedures   Most Recent 3 CBC's:Recent Labs   Lab Test 10/05/21  0533 10/04/21  0625 10/03/21  0636   WBC 8.2 7.0 6.0   HGB 12.5 11.5* 12.0   MCV 90 88 88    252 254     Most Recent 3 BMP's:Recent Labs   Lab Test 10/05/21  0533 10/04/21  0625 10/03/21  0636    139 142   POTASSIUM 4.3 4.2 4.2   CHLORIDE 107 108* 109*   CO2 25 25 24   BUN 13 14 14   CR 0.62 0.54* 0.54*   ANIONGAP 7 6 9   GERALDO 8.8 8.6 8.6    103 116     Most Recent 2 LFT's:Recent Labs   Lab Test 10/03/21  0636 10/01/21  1445   AST 22 29   ALT 24 27   ALKPHOS 46 58   BILITOTAL 0.5 0.7     Most Recent 3 BNP's:No lab results found.  Most Recent D-dimer:Recent Labs   Lab Test 10/05/21  0533   DD 0.57*     Most Recent TSH and T4:Recent Labs   Lab Test 10/02/21  0927   TSH 0.49   ,   Results for orders placed or performed during the hospital encounter of 10/01/21   CT Chest Pulmonary Embolism w Contrast    Narrative    EXAM: CT CHEST PULMONARY EMBOLISM W CONTRAST  LOCATION: [facility]  DATE/TIME: 10/1/2021 4:42 PM    INDICATION: Hypoxia. Recent surgery.  COMPARISON: None.  TECHNIQUE: CT chest pulmonary angiogram during arterial phase injection of IV contrast. Multiplanar reformats and MIP reconstructions were performed. Dose reduction techniques were used.   CONTRAST: isovue 370, 100 mL.    FINDINGS:  ANGIOGRAM CHEST: Pulmonary arteries are normal  caliber and negative for pulmonary emboli. Thoracic aorta is negative for dissection. No CT evidence of right heart strain.    LUNGS AND PLEURA: Moderate peripheral geographic groundglass and solid regions of subpleural consolidation throughout the lungs compatible with COVID-19 pneumonia.    MEDIASTINUM/AXILLAE: Mildly mediastinal lymphadenopathy likely reactive.    CORONARY ARTERY CALCIFICATION: None.    UPPER ABDOMEN: Splenomegaly.    MUSCULOSKELETAL: Normal.      Impression    IMPRESSION:  1.  No pulmonary embolism.    2.  Moderate peripheral groundglass and solid subpleural consolidation in the lungs typical of COVID-19 pneumonia    3.  Mildly mediastinal lymphadenopathy likely reactive    4.  Splenomegaly.       Discharge Medications   Discharge Medication List as of 10/5/2021  2:44 PM      START taking these medications    Details   apixaban ANTICOAGULANT (ELIQUIS) 2.5 MG tablet Take 1 tablet (2.5 mg) by mouth 2 times daily, Disp-60 tablet, R-0, E-Prescribe           Allergies   Allergies   Allergen Reactions     Nkda [No Known Drug Allergies]